# Patient Record
Sex: MALE | Race: WHITE | Employment: FULL TIME | URBAN - METROPOLITAN AREA
[De-identification: names, ages, dates, MRNs, and addresses within clinical notes are randomized per-mention and may not be internally consistent; named-entity substitution may affect disease eponyms.]

---

## 2020-04-07 ENCOUNTER — RECORDS - HEALTHEAST (OUTPATIENT)
Dept: LAB | Facility: CLINIC | Age: 60
End: 2020-04-07

## 2020-04-07 LAB
ANION GAP SERPL CALCULATED.3IONS-SCNC: 15 MMOL/L (ref 5–18)
BUN SERPL-MCNC: 13 MG/DL (ref 8–22)
CALCIUM SERPL-MCNC: 9.8 MG/DL (ref 8.5–10.5)
CHLORIDE BLD-SCNC: 102 MMOL/L (ref 98–107)
CHOLEST SERPL-MCNC: 266 MG/DL
CO2 SERPL-SCNC: 22 MMOL/L (ref 22–31)
CREAT SERPL-MCNC: 1.03 MG/DL (ref 0.7–1.3)
FASTING STATUS PATIENT QL REPORTED: ABNORMAL
GFR SERPL CREATININE-BSD FRML MDRD: >60 ML/MIN/1.73M2
GLUCOSE BLD-MCNC: 94 MG/DL (ref 70–125)
HDLC SERPL-MCNC: 54 MG/DL
LDLC SERPL CALC-MCNC: 186 MG/DL
POTASSIUM BLD-SCNC: 4.7 MMOL/L (ref 3.5–5)
SODIUM SERPL-SCNC: 139 MMOL/L (ref 136–145)
TRIGL SERPL-MCNC: 129 MG/DL

## 2020-06-29 ENCOUNTER — RECORDS - HEALTHEAST (OUTPATIENT)
Dept: LAB | Facility: CLINIC | Age: 60
End: 2020-06-29

## 2020-06-29 LAB
ANION GAP SERPL CALCULATED.3IONS-SCNC: 11 MMOL/L (ref 5–18)
BUN SERPL-MCNC: 11 MG/DL (ref 8–22)
CALCIUM SERPL-MCNC: 9 MG/DL (ref 8.5–10.5)
CHLORIDE BLD-SCNC: 105 MMOL/L (ref 98–107)
CO2 SERPL-SCNC: 24 MMOL/L (ref 22–31)
CREAT SERPL-MCNC: 0.8 MG/DL (ref 0.7–1.3)
GFR SERPL CREATININE-BSD FRML MDRD: >60 ML/MIN/1.73M2
GLUCOSE BLD-MCNC: 98 MG/DL (ref 70–125)
POTASSIUM BLD-SCNC: 4.4 MMOL/L (ref 3.5–5)
PSA SERPL-MCNC: 1.3 NG/ML (ref 0–4.5)
SODIUM SERPL-SCNC: 140 MMOL/L (ref 136–145)

## 2021-05-28 ENCOUNTER — RECORDS - HEALTHEAST (OUTPATIENT)
Dept: ADMINISTRATIVE | Facility: CLINIC | Age: 61
End: 2021-05-28

## 2021-05-29 ENCOUNTER — RECORDS - HEALTHEAST (OUTPATIENT)
Dept: ADMINISTRATIVE | Facility: CLINIC | Age: 61
End: 2021-05-29

## 2022-05-11 ENCOUNTER — APPOINTMENT (OUTPATIENT)
Dept: MRI IMAGING | Facility: HOSPITAL | Age: 62
End: 2022-05-11
Attending: EMERGENCY MEDICINE
Payer: COMMERCIAL

## 2022-05-11 ENCOUNTER — HOSPITAL ENCOUNTER (EMERGENCY)
Facility: HOSPITAL | Age: 62
Discharge: HOME | End: 2022-05-11
Attending: EMERGENCY MEDICINE | Admitting: EMERGENCY MEDICINE
Payer: COMMERCIAL

## 2022-05-11 VITALS
SYSTOLIC BLOOD PRESSURE: 143 MMHG | OXYGEN SATURATION: 94 % | RESPIRATION RATE: 18 BRPM | DIASTOLIC BLOOD PRESSURE: 102 MMHG | HEIGHT: 68 IN | HEART RATE: 83 BPM | WEIGHT: 205 LBS | TEMPERATURE: 98.9 F | BODY MASS INDEX: 31.07 KG/M2

## 2022-05-11 DIAGNOSIS — I10 ESSENTIAL HYPERTENSION: ICD-10-CM

## 2022-05-11 DIAGNOSIS — H53.2 DIPLOPIA: ICD-10-CM

## 2022-05-11 DIAGNOSIS — H53.8 BLURRED VISION: ICD-10-CM

## 2022-05-11 LAB
ANION GAP SERPL CALCULATED.3IONS-SCNC: 9 MMOL/L (ref 5–18)
BUN SERPL-MCNC: 11 MG/DL (ref 8–22)
C REACTIVE PROTEIN LHE: 0.2 MG/DL (ref 0–0.8)
CALCIUM SERPL-MCNC: 9.4 MG/DL (ref 8.5–10.5)
CHLORIDE BLD-SCNC: 107 MMOL/L (ref 98–107)
CO2 SERPL-SCNC: 25 MMOL/L (ref 22–31)
CREAT SERPL-MCNC: 0.88 MG/DL (ref 0.7–1.3)
ERYTHROCYTE [DISTWIDTH] IN BLOOD BY AUTOMATED COUNT: 14.5 % (ref 10–15)
ERYTHROCYTE [SEDIMENTATION RATE] IN BLOOD BY WESTERGREN METHOD: 2 MM/HR (ref 0–15)
GFR SERPL CREATININE-BSD FRML MDRD: >90 ML/MIN/1.73M2
GLUCOSE BLD-MCNC: 115 MG/DL (ref 70–125)
HCT VFR BLD AUTO: 48.7 % (ref 40–53)
HGB BLD-MCNC: 15.6 G/DL (ref 13.3–17.7)
INR PPP: 0.91 (ref 0.9–1.15)
MCH RBC QN AUTO: 28.1 PG (ref 26.5–33)
MCHC RBC AUTO-ENTMCNC: 32 G/DL (ref 31.5–36.5)
MCV RBC AUTO: 88 FL (ref 78–100)
PLATELET # BLD AUTO: 280 10E3/UL (ref 150–450)
POTASSIUM BLD-SCNC: 4.1 MMOL/L (ref 3.5–5)
RBC # BLD AUTO: 5.56 10E6/UL (ref 4.4–5.9)
SODIUM SERPL-SCNC: 141 MMOL/L (ref 136–145)
TSH SERPL DL<=0.005 MIU/L-ACNC: 0.49 UIU/ML (ref 0.3–5)
WBC # BLD AUTO: 7.2 10E3/UL (ref 4–11)

## 2022-05-11 PROCEDURE — 99285 EMERGENCY DEPT VISIT HI MDM: CPT | Mod: 25

## 2022-05-11 PROCEDURE — 86140 C-REACTIVE PROTEIN: CPT | Performed by: EMERGENCY MEDICINE

## 2022-05-11 PROCEDURE — 83516 IMMUNOASSAY NONANTIBODY: CPT | Mod: 91 | Performed by: EMERGENCY MEDICINE

## 2022-05-11 PROCEDURE — 85610 PROTHROMBIN TIME: CPT | Performed by: EMERGENCY MEDICINE

## 2022-05-11 PROCEDURE — 85027 COMPLETE CBC AUTOMATED: CPT | Performed by: EMERGENCY MEDICINE

## 2022-05-11 PROCEDURE — 80048 BASIC METABOLIC PNL TOTAL CA: CPT | Performed by: EMERGENCY MEDICINE

## 2022-05-11 PROCEDURE — 93005 ELECTROCARDIOGRAM TRACING: CPT | Performed by: STUDENT IN AN ORGANIZED HEALTH CARE EDUCATION/TRAINING PROGRAM

## 2022-05-11 PROCEDURE — 83516 IMMUNOASSAY NONANTIBODY: CPT | Performed by: EMERGENCY MEDICINE

## 2022-05-11 PROCEDURE — 85652 RBC SED RATE AUTOMATED: CPT | Performed by: EMERGENCY MEDICINE

## 2022-05-11 PROCEDURE — 36415 COLL VENOUS BLD VENIPUNCTURE: CPT | Performed by: EMERGENCY MEDICINE

## 2022-05-11 PROCEDURE — 83519 RIA NONANTIBODY: CPT | Performed by: EMERGENCY MEDICINE

## 2022-05-11 PROCEDURE — 70540 MRI ORBIT/FACE/NECK W/O DYE: CPT

## 2022-05-11 PROCEDURE — 84443 ASSAY THYROID STIM HORMONE: CPT | Performed by: EMERGENCY MEDICINE

## 2022-05-11 PROCEDURE — 93005 ELECTROCARDIOGRAM TRACING: CPT

## 2022-05-11 RX ORDER — GADOBUTROL 604.72 MG/ML
9 INJECTION INTRAVENOUS ONCE
Status: DISCONTINUED | OUTPATIENT
Start: 2022-05-11 | End: 2022-05-11 | Stop reason: HOSPADM

## 2022-05-11 NOTE — ED TRIAGE NOTES
Pt presents with blurred vision and hypertension sent from eye doctor. At 0830 this am pt started having blurred vision. Made the eye dr appt accordingly. Arrives here with no further neuro deficits. Negative BeFast.  Provider to triage for neuro eval.     Triage Assessment     Row Name 05/11/22 1024       Triage Assessment (Adult)    Airway WDL WDL    Last Known Well Date 05/11/22    Last Known Well Time 0830    Additional Documentation Last Known Well Date/Time (Rows)       Respiratory WDL    Respiratory WDL WDL       Skin Circulation/Temperature WDL    Skin Circulation/Temperature WDL WDL       Cardiac WDL    Cardiac WDL WDL       Peripheral/Neurovascular WDL    Peripheral Neurovascular WDL WDL       Cognitive/Neuro/Behavioral WDL    Cognitive/Neuro/Behavioral WDL WDL

## 2022-05-11 NOTE — ED NOTES
Pt alert and pleasant.  Pt alert and oriented x4. Reports vision changes resolved. Denies headache, sob, and dizziness. Denies any pain/discomfort.

## 2022-05-11 NOTE — ED PROVIDER NOTES
"EMERGENCY DEPARTMENT ENCOUNTER      NAME: Damian Hartmann  AGE: 62 year old male  YOB: 1960  MRN: [id number]  EVALUATION DATE & TIME: 5/11/2022 No admission date for patient encounter.    PCP: No primary care provider on file.    ED PROVIDER: Mark Tavera M.D.      Chief Complaint   Patient presents with     Eye Problem     Hypertension         FINAL IMPRESSION:  Blurred vision  Transient diplopia      ED COURSE & MEDICAL DECISION MAKING:    Pertinent Labs & Imaging studies reviewed. (See chart for details)  62 year old male presents to the Emergency Department for evaluation of blurred vision, high blood pressure.  Patient awakened this morning feeling well.  Around 8:30 AM he noted sudden blurring of his vision and \"double vision\".  Was seen in the eye clinic.  Exam there was unremarkable.  However they called with concerns of discomfort with eye movement and patient was also markedly hypertensive.  On arrival patient denies any discomfort with eye movement.  Denies any double vision during exam of extraocular movements.  Some blurring of vision however eyes dilated medically.  Negative ulnar drift.  Negative Romberg.  Negative finger-nose-finger.  Patient noted to be slightly proptotic however.  No indications of focal findings to suggest central neurologic process.  Possibility of bilateral optic nerve issues but seems unlikely.  Proceeding with MRI out of caution.  Blood work including laboratory markers and TSH.  Blood pressure is moderated here to 172/98 systolic.  No further interventions at this time.  Patient appears non toxic with stable vitals signs. Overall exam is benign.      10:20 AM Provider called to triage for neuro assessment. I met with the patient for the initial interview and physical examination. Discussed plan for treatment and workup in the ED.    12:09 PM.  C-reactive protein normal at 0.2.  Electrolytes normal.  White cell count normal.  INR normal.  Awaiting sed rate and " MRI.  1:30 PM.  Patient discussed with Dr. Leach.  He recommends MRI of the orbits.  Also to get a acethylcholine antibodies.  These have been ordered.  2:20 PM.  Patient discussed with Dr. Tompkins at end of shift.  MRI pending.  If MRI is negative patient would be appropriate for continued outpatient follow-up with neurology  At the conclusion of the encounter I discussed the results of all of the tests and the disposition. The questions were answered and return precautions provided. The patient or family acknowledged understanding and was agreeable with the care plan.       PPE: Provider wore gloves, N95 mask, eye protection, surgical cap.     MEDICATIONS GIVEN IN THE EMERGENCY:  Medications - No data to display    NEW PRESCRIPTIONS STARTED AT TODAY'S ER VISIT  New Prescriptions    No medications on file          =================================================================    HPI    Patient information was obtained from: Patient     Use of Intrepreter: N/A         Damian Hartmann is a 62 year old male with a pertient medical history of hypertension who presents to the ED for evaluation of visual disturbance.     Patient states that he woke up this morning feeling fine. Around 8:30 AM he developed blurred vision and he thinks he also had some double vision. He went to the eye clinic where they did a complete eye exam and had no unusual findings. Patient was reporting some discomfort with eye movements at the eye clinic, but he does not have any discomfort currently. No new medications or exposures. Patient otherwise denies headache or additional symptoms or complaints at this time.       REVIEW OF SYSTEMS   Constitutional:  Denies fever, chills  Eyes: Reports blurred vision and diplopia.   Respiratory:  Denies productive cough or increased work of breathing  Cardiovascular:  Denies chest pain, palpitations  GI:  Denies abdominal pain, nausea, vomiting, or change in bowel or bladder habits   Musculoskeletal:  Denies  "any new muscle/joint swelling  Skin:  Denies rash   Neurologic:  Denies focal weakness  All systems negative except as marked.     PAST MEDICAL HISTORY:  Past Medical History:   Diagnosis Date     Hypertension        PAST SURGICAL HISTORY:  Past Surgical History:   Procedure Laterality Date     CATARACT EXTRACTION Bilateral          CURRENT MEDICATIONS:    No current facility-administered medications for this encounter.  No current outpatient medications on file.    ALLERGIES:  No Known Allergies    FAMILY HISTORY:  History reviewed. No pertinent family history.    SOCIAL HISTORY:   Social History     Socioeconomic History     Marital status:        VITALS:  Patient Vitals for the past 24 hrs:   BP Temp Temp src Pulse Resp SpO2 Height Weight   05/11/22 1023 -- -- -- -- -- -- 1.727 m (5' 8\") 93 kg (205 lb)   05/11/22 1020 (!) 172/98 98.9  F (37.2  C) Temporal 83 18 96 % -- --        PHYSICAL EXAM    Constitutional:  Awake, alert, in no apparent distress  HENT:  Normocephalic, Atraumatic. Bilateral external ears normal. Oropharynx moist. Nose normal. Neck- Normal range of motion with no guarding, No midline cervical tenderness, Supple, No stridor.   Eyes:  Eyes dilated (medically), Seems slightly proptotic, EOMI with no signs of entrapment, No photophobia, Conjunctiva normal, No discharge.   Respiratory:  Normal breath sounds, No respiratory distress, No wheezing.    Cardiovascular:  Normal heart rate, Normal rhythm, No appreciable rubs or gallops.   GI:  Soft, No tenderness, No distension, No palpable masses  Musculoskeletal:  No edema. Good range of motion in all major joints. No tenderness to palpation or major deformities noted.  Integument:  Warm, Dry, No erythema, No rash.   Neurologic:  Alert & oriented, Normal motor function, Normal sensory function, No focal deficits noted.   Psychiatric:  Affect normal, Judgment normal, Mood normal.       LAB:  All pertinent labs reviewed and interpreted.  Results for " orders placed or performed during the hospital encounter of 05/11/22   Basic metabolic panel     Status: Normal   Result Value Ref Range    Sodium 141 136 - 145 mmol/L    Potassium 4.1 3.5 - 5.0 mmol/L    Chloride 107 98 - 107 mmol/L    Carbon Dioxide (CO2) 25 22 - 31 mmol/L    Anion Gap 9 5 - 18 mmol/L    Urea Nitrogen 11 8 - 22 mg/dL    Creatinine 0.88 0.70 - 1.30 mg/dL    Calcium 9.4 8.5 - 10.5 mg/dL    Glucose 115 70 - 125 mg/dL    GFR Estimate >90 >60 mL/min/1.73m2   CBC (+ platelets, no diff)     Status: Normal   Result Value Ref Range    WBC Count 7.2 4.0 - 11.0 10e3/uL    RBC Count 5.56 4.40 - 5.90 10e6/uL    Hemoglobin 15.6 13.3 - 17.7 g/dL    Hematocrit 48.7 40.0 - 53.0 %    MCV 88 78 - 100 fL    MCH 28.1 26.5 - 33.0 pg    MCHC 32.0 31.5 - 36.5 g/dL    RDW 14.5 10.0 - 15.0 %    Platelet Count 280 150 - 450 10e3/uL   INR     Status: Normal   Result Value Ref Range    INR 0.91 0.90 - 1.15   CRP inflammation     Status: Normal   Result Value Ref Range    CRP 0.2 0.0 - 0.8 mg/dL   Erythrocyte sedimentation rate auto     Status: Normal   Result Value Ref Range    Erythrocyte Sedimentation Rate 2 0 - 15 mm/hr   TSH with free T4 reflex     Status: Normal   Result Value Ref Range    TSH 0.49 0.30 - 5.00 uIU/mL     RADIOLOGY:  Reviewed all pertinent imaging. Please see official radiology report.  MR Brain w/o & w Contrast    (Results Pending)       EKG:    Normal sinus rhythm.  Rate of 68.  Normal QRS.  Normal ST segments.  Normal EKG.  I have independently reviewed and interpreted the EKG(s) documented above.    PROCEDURES:   National Institutes [employer] Stroke Scale  Exam Interval:   Score    Level of consciousness: 0    LOC questions: 0    LOC commands: 0    Best gaze: 0    Visual: 0    Facial palsy: 0    Motor arm (left): 0    Motor arm (right): 0    Motor leg (left): 0    Motor leg (right): 0    Limb ataxia: 0    Sensory: 0    Best language: 0    Dysarthria: 0    Extinction and inattention: 0        Total  Score: 0         I, Jayde Lynn, am serving as a scribe to document services personally performed by Mark Tavera MD, based on my observation and the provider's statements to me. I, Mark Tavera MD attest that Jayde Lynn is acting in a scribe capacity, has observed my performance of the services and has documented them in accordance with my direction.    Mark Tavera M.D.  Emergency Medicine  [facility] EMERGENCY DEPARTMENT     Mark Tavera MD  05/11/22 1048       Mark Tavera MD  05/11/22 7069

## 2022-05-11 NOTE — ED NOTES
EMERGENCY DEPARTMENT SIGN OUT NOTE            ED COURSE AND MEDICAL DECISION MAKING  62-year-old male who developed both blurry vision and double vision earlier this morning was sent into the ED by ophthalmology for further evaluation and imaging.  The patient was checked out to me by Dr. Tavera with an MRI of his brain still pending.  The patient's case was discussed with the on-call neurologist and additional tests checking for myasthenia gravis have been obtained and are currently pending.    The laboratory tests were reviewed.  CBC, BMP, ESR, CRP, and TSH were all reassuring.    MRI of the brain did not show any acute intracranial abnormalities.  The contrast portion of the MRI was not completed, however.    The patient was reevaluated and informed of the lab and imaging results.  At the time of reevaluation the patient denied experiencing any double vision, blurred vision, or any other symptoms.  The patient did note that his blood pressure was fairly elevated this morning when he was initially seen with the vision changes.  The patient was informed that in the blurry vision may be related to hypertension, but that the diplopia is less likely to be related to the high blood pressure.  After reassuring the patient and his wife they both felt comfortable returning home.  The patient was instructed to follow-up with neurology as an outpatient for reevaluation.  The patient was instructed return back to ED sooner for any worsening vision changes, elevated blood pressures, headaches, or any other new or concerning symptoms.      Patient was signed out to me by Dr Mark Tavera at 2:25 PM.         At time of sign out, disposition was pending MRI. If MRI negative patient can be discharged.     FINAL IMPRESSION    1. Blurred vision    2. Diplopia    3. Essential hypertension        ED MEDS  Medications   gadobutrol (GADAVIST) injection 9 mL (has no administration in time range)       LAB  Labs Ordered and Resulted from  Time of ED Arrival to Time of ED Departure   BASIC METABOLIC PANEL - Normal       Result Value    Sodium 141      Potassium 4.1      Chloride 107      Carbon Dioxide (CO2) 25      Anion Gap 9      Urea Nitrogen 11      Creatinine 0.88      Calcium 9.4      Glucose 115      GFR Estimate >90     CBC WITH PLATELETS - Normal    WBC Count 7.2      RBC Count 5.56      Hemoglobin 15.6      Hematocrit 48.7      MCV 88      MCH 28.1      MCHC 32.0      RDW 14.5      Platelet Count 280     INR - Normal    INR 0.91     CRP INFLAMMATION - Normal    CRP 0.2     ERYTHROCYTE SEDIMENTATION RATE AUTO - Normal    Erythrocyte Sedimentation Rate 2     TSH WITH FREE T4 REFLEX - Normal    TSH 0.49     ACETYLCHOLINE MODULATING ANTIBODY   ACETYLCHOLINE RECEPTOR BINDING   ACETYLCHOLINE RECEPTOR BLOCKING MELINDA         RADIOLOGY    MR Brain and Orbits wo Contrast   Final Result   CONCLUSION:   1.  Patient declined to complete the entire examination of the brain and orbits. No postcontrast imaging was performed.   2.  No acute infarct, mass or hydrocephalus.   3.  Unremarkable noncontrast exam of the orbits.          DISCHARGE MEDS  There are no discharge medications for this patient.      Jose Roberto Tompkins DO  [facility] EMERGENCY DEPARTMENT  [facility] [facility] [id number] [phone]     Jose Roberto Tompkins DO  05/11/22 1882

## 2022-05-11 NOTE — DISCHARGE INSTRUCTIONS
The brain MRI and laboratory test that returned to the ED all appear reassuring.  Additional laboratory tests were also obtained and are currently pending.  The exact etiology for your patient changes remains unclear but it is possible that your vision changes were related to high blood pressure.      Follow-up with the neurologist as an outpatient for reevaluation.  Return back to ED sooner for any worsening vision changes, headaches, confusion, or any other new or concerning symptoms.

## 2022-05-11 NOTE — ED NOTES
Expected Patient Referral to ED  10:11 AM    Referring Clinic/Provider:  [facility]    Reason for referral/Clinical facts:  Blurred vision both eyes awakened normally and then had eye changes this AM.  Cataract surgery few months ago.  Hypertensive today.  Discomfort with extraocular movements    Recommendations provided:  See and treat    Discussed that if direct admit is sought and any hurdles are encountered, this ED would be happy to see the patient and evaluate.    Informed caller that recommendations provided are recommendations based only on the facts provided and that they responsible to accept or reject the advice, or to seek a formal in person consultation as needed and that this ED will see/treat patient should they arrive.      Mark Tavera MD  [facility] EMERGENCY DEPARTMENT  [address] [place] [id number] [phone]       Mark Tavera MD  05/11/22 1013

## 2022-05-13 LAB
ATRIAL RATE - MUSE: 68 BPM
DIASTOLIC BLOOD PRESSURE - MUSE: NORMAL MMHG
INTERPRETATION ECG - MUSE: NORMAL
P AXIS - MUSE: 24 DEGREES
PR INTERVAL - MUSE: 172 MS
QRS DURATION - MUSE: 102 MS
QT - MUSE: 412 MS
QTC - MUSE: 438 MS
R AXIS - MUSE: -3 DEGREES
SYSTOLIC BLOOD PRESSURE - MUSE: NORMAL MMHG
T AXIS - MUSE: 26 DEGREES
VENTRICULAR RATE- MUSE: 68 BPM

## 2022-05-16 LAB
ACHR BLOCK AB/ACHR TOTAL SFR SER: 0 %
ACHR MOD AB/ACHR TOTAL SFR SER: 0 %

## 2022-05-23 LAB — ACHR BIND AB SER-SCNC: 0 NMOL/L
